# Patient Record
(demographics unavailable — no encounter records)

---

## 2024-11-20 NOTE — DISCUSSION/SUMMARY
[FreeTextEntry1] : 1)Cardiac: multiple risk factors including DM; EKG normal.  Plain exercise stress test neg in 2018; nuc neg 2021. 2)HTN: BP at goal  -on amlodipine -strict diet/exercise/salt restriction 3)HL: on statin and tolerating it well Tchol 124 / LDL 42 / HDL 56 / trig 132 -cont statin 4)DM: as per primary  5)Moderate MR: -repeat echo [EKG obtained to assist in diagnosis and management of assessed problem(s)] : EKG obtained to assist in diagnosis and management of assessed problem(s)

## 2024-11-20 NOTE — PHYSICAL EXAM
[General Appearance - Well Developed] : well developed [Normal Appearance] : normal appearance [Well Groomed] : well groomed [General Appearance - Well Nourished] : well nourished [No Deformities] : no deformities [General Appearance - In No Acute Distress] : no acute distress [Normal Conjunctiva] : the conjunctiva exhibited no abnormalities [Eyelids - No Xanthelasma] : the eyelids demonstrated no xanthelasmas [Normal Oral Mucosa] : normal oral mucosa [No Oral Pallor] : no oral pallor [No Oral Cyanosis] : no oral cyanosis [Normal Jugular Venous A Waves Present] : normal jugular venous A waves present [Normal Jugular Venous V Waves Present] : normal jugular venous V waves present [No Jugular Venous Andrade A Waves] : no jugular venous andrade A waves [Abdomen Soft] : soft [Abdomen Tenderness] : non-tender [Abdomen Mass (___ Cm)] : no abdominal mass palpated [Abnormal Walk] : normal gait [Gait - Sufficient For Exercise Testing] : the gait was sufficient for exercise testing [Nail Clubbing] : no clubbing of the fingernails [Cyanosis, Localized] : no localized cyanosis [Petechial Hemorrhages (___cm)] : no petechial hemorrhages [Skin Color & Pigmentation] : normal skin color and pigmentation [No Venous Stasis] : no venous stasis [] : no rash [Skin Lesions] : no skin lesions [No Skin Ulcers] : no skin ulcer [No Xanthoma] : no  xanthoma was observed [Oriented To Time, Place, And Person] : oriented to person, place, and time [Affect] : the affect was normal [Mood] : the mood was normal [No Anxiety] : not feeling anxious [Normal Rate] : normal [Normal S1] : normal S1 [Normal S2] : normal S2 [S3] : no S3 [S4] : no S4 [No Murmur] : no murmurs heard [Right Carotid Bruit] : no bruit heard over the right carotid [Left Carotid Bruit] : no bruit heard over the left carotid [Right Femoral Bruit] : no bruit heard over the right femoral artery [Left Femoral Bruit] : no bruit heard over the left femoral artery [2+] : left 2+ [No Abnormalities] : the abdominal aorta was not enlarged and no bruit was heard [No Pitting Edema] : no pitting edema present [Normal Rhythm/Effort] : normal respiratory rhythm and effort [Clear Bilaterally] : the lungs were clear to auscultation bilaterally [Normal to Percussion] : the lungs were normal to percussion [Normal] : palpation of the chest was normal

## 2024-11-20 NOTE — HISTORY OF PRESENT ILLNESS
[FreeTextEntry1] : 71yo man with a PMH of HTN, HL, DM; here for routine evaluation.  Feeling well overall; denied chest pain/palpitations/dyspnea/syncope.  Compliant with meds.  6/22/21 Here for routine eval; has been consumed with his wifes illness lately  SBPs at goal on meds   Tchol 124 / LDL 42 / HDL 56 / trig 132  7/3/23: feeling well overall FLP at goal some non-specific EKG changes  11/20/24: feeling well no dyspnea last echo with nl LVEF but moderate MR

## 2025-01-17 NOTE — HISTORY OF PRESENT ILLNESS
[FreeTextEntry1] : Patient presents today for longitudinal care. [de-identified] : Patient is a 78yr old male who presents today for longitudinal care.  Patient is doing well overall and has not gotten sick since last visit. Monitoring PSA with urology   Denies any CP, chest tightness or SOB. Denies any abdominal pain, urinary symptom, or change in bowel habits. Denies any fever, chills, or night sweats.

## 2025-01-17 NOTE — ADDENDUM
[FreeTextEntry1] : I, Milan Griffith, acted as a scribe on behalf of Dr. Gee Wilkins MD, on 01/17/2025.   All medical entries made by the scribe were at my, Dr. Gee Wilkins MD, direction and personally dictated by me on 01/17/2025. I have reviewed the chart and agree that the record accurately reflects my personal performance of the history, physical exam, assessment and plan. I have also personally directed, reviewed, and agreed with the chart.

## 2025-01-17 NOTE — HEALTH RISK ASSESSMENT
[Yes] : Yes [No] : In the past 12 months have you used drugs other than those required for medical reasons? No [0] : 2) Feeling down, depressed, or hopeless: Not at all (0) [PHQ-2 Negative - No further assessment needed] : PHQ-2 Negative - No further assessment needed [Never] : Never [de-identified] : social [[id number]] : 0

## 2025-01-17 NOTE — ASSESSMENT
[FreeTextEntry1] : Follow up visit: Benign essential HTN, HLD, DM II with diabetic neuropathy  -BP is elevated. Continue current management. - Check A1c. Lipid panel, vitamin levels, urine analysis, TSH, PSA Profile Total and Free   - RTO in 3 months  Pt verbalized understanding and will reach should any questions/concerns occur.

## 2025-05-20 NOTE — PHYSICAL EXAM
[General Appearance - Well Developed] : well developed [Normal Appearance] : normal appearance [Well Groomed] : well groomed [General Appearance - Well Nourished] : well nourished [No Deformities] : no deformities [General Appearance - In No Acute Distress] : no acute distress [Normal Conjunctiva] : the conjunctiva exhibited no abnormalities [Eyelids - No Xanthelasma] : the eyelids demonstrated no xanthelasmas [Normal Oral Mucosa] : normal oral mucosa [No Oral Pallor] : no oral pallor [No Oral Cyanosis] : no oral cyanosis [Normal Jugular Venous A Waves Present] : normal jugular venous A waves present [Normal Jugular Venous V Waves Present] : normal jugular venous V waves present [No Jugular Venous Andrade A Waves] : no jugular venous andrade A waves [Abdomen Soft] : soft [Abdomen Tenderness] : non-tender [Abdomen Mass (___ Cm)] : no abdominal mass palpated [Abnormal Walk] : normal gait [Gait - Sufficient For Exercise Testing] : the gait was sufficient for exercise testing [Nail Clubbing] : no clubbing of the fingernails [Cyanosis, Localized] : no localized cyanosis [Petechial Hemorrhages (___cm)] : no petechial hemorrhages [Skin Color & Pigmentation] : normal skin color and pigmentation [] : no rash [No Venous Stasis] : no venous stasis [Skin Lesions] : no skin lesions [No Skin Ulcers] : no skin ulcer [No Xanthoma] : no  xanthoma was observed [Oriented To Time, Place, And Person] : oriented to person, place, and time [Affect] : the affect was normal [Mood] : the mood was normal [No Anxiety] : not feeling anxious [Normal Rate] : normal [Normal S1] : normal S1 [Normal S2] : normal S2 [S3] : no S3 [S4] : no S4 [No Murmur] : no murmurs heard [Right Carotid Bruit] : no bruit heard over the right carotid [Left Carotid Bruit] : no bruit heard over the left carotid [Right Femoral Bruit] : no bruit heard over the right femoral artery [Left Femoral Bruit] : no bruit heard over the left femoral artery [2+] : left 2+ [No Abnormalities] : the abdominal aorta was not enlarged and no bruit was heard [No Pitting Edema] : no pitting edema present [Normal Rhythm/Effort] : normal respiratory rhythm and effort [Clear Bilaterally] : the lungs were clear to auscultation bilaterally [Normal to Percussion] : the lungs were normal to percussion [Normal] : palpation of the chest was normal

## 2025-05-20 NOTE — DISCUSSION/SUMMARY
[FreeTextEntry1] : 1)Cardiac: multiple risk factors including DM; EKG normal.  Plain exercise stress test neg in 2018; nuc neg 2021. -offered screening stress test but refused at this time; states he walks everywhere w/o issue 2)HTN: BP at goal  -on amlodipine -strict diet/exercise/salt restriction 3)HL: on statin and tolerating it well Tchol 124 / LDL 42 / HDL 56 / trig 132 -cont statin 4)DM: as per primary  5)Moderate MR: -repeat echo in 1 year [EKG obtained to assist in diagnosis and management of assessed problem(s)] : EKG obtained to assist in diagnosis and management of assessed problem(s)

## 2025-05-20 NOTE — HISTORY OF PRESENT ILLNESS
[FreeTextEntry1] : 73yo man with a PMH of HTN, HL, DM; here for routine evaluation.  Feeling well overall; denied chest pain/palpitations/dyspnea/syncope.  Compliant with meds.  6/22/21 Here for routine eval; has been consumed with his wifes illness lately  SBPs at goal on meds   Tchol 124 / LDL 42 / HDL 56 / trig 132  7/3/23: feeling well overall FLP at goal some non-specific EKG changes  11/20/24: feeling well no dyspnea last echo with nl LVEF but moderate MR  5/20/25: feeling well no dyspnea last echo with nl LVEF but moderate TR, mild-mod MR